# Patient Record
Sex: FEMALE | Race: WHITE
[De-identification: names, ages, dates, MRNs, and addresses within clinical notes are randomized per-mention and may not be internally consistent; named-entity substitution may affect disease eponyms.]

---

## 2018-02-22 ENCOUNTER — HOSPITAL ENCOUNTER (EMERGENCY)
Dept: HOSPITAL 5 - ED | Age: 23
Discharge: HOME | End: 2018-02-22
Payer: COMMERCIAL

## 2018-02-22 VITALS — DIASTOLIC BLOOD PRESSURE: 61 MMHG | SYSTOLIC BLOOD PRESSURE: 112 MMHG

## 2018-02-22 DIAGNOSIS — S39.012A: Primary | ICD-10-CM

## 2018-02-22 DIAGNOSIS — F17.200: ICD-10-CM
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
## 2018-02-22 PROCEDURE — 99284 EMERGENCY DEPT VISIT MOD MDM: CPT

## 2018-02-22 PROCEDURE — 72070 X-RAY EXAM THORAC SPINE 2VWS: CPT

## 2018-02-22 PROCEDURE — 81025 URINE PREGNANCY TEST: CPT

## 2018-02-22 PROCEDURE — 72100 X-RAY EXAM L-S SPINE 2/3 VWS: CPT
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
## 2019-02-04 ENCOUNTER — HOSPITAL ENCOUNTER (EMERGENCY)
Dept: HOSPITAL 5 - ED | Age: 24
Discharge: HOME | End: 2019-02-04
Payer: COMMERCIAL

## 2019-02-04 VITALS — SYSTOLIC BLOOD PRESSURE: 113 MMHG | DIASTOLIC BLOOD PRESSURE: 67 MMHG
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
## 2019-02-04 DIAGNOSIS — F17.200: ICD-10-CM
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
## 2019-02-04 DIAGNOSIS — M79.10: ICD-10-CM

## 2019-02-04 DIAGNOSIS — R07.89: Primary | ICD-10-CM
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
## 2019-02-04 PROCEDURE — 99283 EMERGENCY DEPT VISIT LOW MDM: CPT

## 2019-02-04 PROCEDURE — 71046 X-RAY EXAM CHEST 2 VIEWS: CPT
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
## 2019-02-04 NOTE — XRAY REPORT
FINAL REPORT



EXAM:  XR CHEST ROUTINE 2V



HISTORY:  PAIN 



TECHNIQUE:  Two view chest PA and lateral 



PRIORS:  Comparison is dated December 2, 2018



FINDINGS:  

Cardiac and mediastinal contours are unremarkable.  No focal pulmonary infiltrate is identified.  No 
pleural fluid collection seen. Pulmonary vasculature is unremarkable. 



IMPRESSION:  

Negative two-view chest
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
